# Patient Record
Sex: FEMALE | Race: BLACK OR AFRICAN AMERICAN | NOT HISPANIC OR LATINO | URBAN - METROPOLITAN AREA
[De-identification: names, ages, dates, MRNs, and addresses within clinical notes are randomized per-mention and may not be internally consistent; named-entity substitution may affect disease eponyms.]

---

## 2017-04-14 ENCOUNTER — IMPORTED ENCOUNTER (OUTPATIENT)
Dept: URBAN - METROPOLITAN AREA CLINIC 59 | Facility: CLINIC | Age: 67
End: 2017-04-14

## 2017-04-14 PROBLEM — E11.3293 TYPE II DM W/ MILD NONPROLIFERATIVE DIABETIC RETINOPATHY W/O MACULAR EDEMA OF BILATERAL EYES: Noted: 2017-04-14

## 2017-04-14 PROBLEM — H04.123 TEAR FILM INSUFFICIENCY OF BILATERAL LACRIMAL GLANDS: Noted: 2017-04-14

## 2017-04-14 PROBLEM — H34.8322 TRIBUTARY RETINAL VEIN OCCLUSION OF LT EYE, STABLE: Noted: 2017-04-14

## 2017-04-14 PROCEDURE — 92014 COMPRE OPH EXAM EST PT 1/>: CPT

## 2017-04-14 PROCEDURE — 92134 CPTRZ OPH DX IMG PST SGM RTA: CPT

## 2017-05-12 ENCOUNTER — IMPORTED ENCOUNTER (OUTPATIENT)
Dept: URBAN - METROPOLITAN AREA CLINIC 59 | Facility: CLINIC | Age: 67
End: 2017-05-12

## 2017-05-12 PROBLEM — Z01.00 ENCOUNTER FOR EXAMINATION OF VISION WITHOUT ABNORMAL FINDING: Noted: 2017-05-12

## 2017-05-12 PROBLEM — H52.4 PRESBYOPIA: Noted: 2017-05-12

## 2018-03-12 ENCOUNTER — IMPORTED ENCOUNTER (OUTPATIENT)
Dept: URBAN - METROPOLITAN AREA CLINIC 59 | Facility: CLINIC | Age: 68
End: 2018-03-12

## 2018-03-12 PROBLEM — E11.3293 TYPE II DM W/ MILD NONPROLIFERATIVE DIABETIC RETINOPATHY W/O MACULAR EDEMA OF BILATERAL EYES: Noted: 2018-03-12

## 2018-03-12 PROBLEM — H25.13 BILATERAL NUCLEAR SCLEROSIS CATARACTS: Noted: 2018-03-12

## 2018-03-12 PROBLEM — H34.8322 TRIBUTARY RETINAL VEIN OCCLUSION OF LT EYE, STABLE: Noted: 2018-03-12

## 2018-03-12 PROBLEM — H04.123 TEAR FILM INSUFFICIENCY OF BILATERAL LACRIMAL GLANDS: Noted: 2018-03-12

## 2018-03-12 PROCEDURE — 92014 COMPRE OPH EXAM EST PT 1/>: CPT

## 2018-03-12 PROCEDURE — 92134 CPTRZ OPH DX IMG PST SGM RTA: CPT

## 2018-12-10 ENCOUNTER — IMPORTED ENCOUNTER (OUTPATIENT)
Dept: URBAN - METROPOLITAN AREA CLINIC 59 | Facility: CLINIC | Age: 68
End: 2018-12-10

## 2018-12-10 PROBLEM — E11.3293 TYPE II DM W/ MILD NONPROLIFERATIVE DIABETIC RETINOPATHY W/O MACULAR EDEMA OF BILATERAL EYES: Noted: 2018-12-10

## 2018-12-10 PROBLEM — H34.8322 TRIBUTARY RETINAL VEIN OCCLUSION OF LT EYE, STABLE: Noted: 2018-12-10

## 2018-12-10 PROBLEM — H04.123 TEAR FILM INSUFFICIENCY OF BILATERAL LACRIMAL GLANDS: Noted: 2018-12-10

## 2018-12-10 PROBLEM — H25.13 BILATERAL NUCLEAR SCLEROSIS CATARACTS: Noted: 2018-12-10

## 2018-12-10 PROBLEM — Z79.4 LONG TERM USE OF INSULIN: Noted: 2018-12-10

## 2018-12-10 PROCEDURE — 92014 COMPRE OPH EXAM EST PT 1/>: CPT

## 2018-12-10 PROCEDURE — 92134 CPTRZ OPH DX IMG PST SGM RTA: CPT

## 2019-09-30 ENCOUNTER — IMPORTED ENCOUNTER (OUTPATIENT)
Dept: URBAN - METROPOLITAN AREA CLINIC 59 | Facility: CLINIC | Age: 69
End: 2019-09-30

## 2019-09-30 PROBLEM — H34.8322 TRIBUTARY RETINAL VEIN OCCLUSION OF LT EYE, STABLE: Noted: 2019-09-30

## 2019-09-30 PROBLEM — H04.123 TEAR FILM INSUFFICIENCY OF BILATERAL LACRIMAL GLANDS: Noted: 2019-09-30

## 2019-09-30 PROBLEM — Z79.4 LONG TERM USE OF INSULIN: Noted: 2019-09-30

## 2019-09-30 PROBLEM — H25.13 BILATERAL NUCLEAR SCLEROSIS CATARACTS: Noted: 2019-09-30

## 2019-09-30 PROBLEM — E11.3293 TYPE II DM W/ MILD NONPROLIFERATIVE DIABETIC RETINOPATHY W/O MACULAR EDEMA OF BILATERAL EYES: Noted: 2019-09-30

## 2019-09-30 PROCEDURE — 92134 CPTRZ OPH DX IMG PST SGM RTA: CPT

## 2019-09-30 PROCEDURE — 92014 COMPRE OPH EXAM EST PT 1/>: CPT

## 2020-03-30 ENCOUNTER — APPOINTMENT (RX ONLY)
Dept: URBAN - METROPOLITAN AREA CLINIC 348 | Facility: CLINIC | Age: 70
Setting detail: DERMATOLOGY
End: 2020-03-30

## 2020-03-30 DIAGNOSIS — L50.8 OTHER URTICARIA: ICD-10-CM

## 2020-03-30 PROBLEM — I10 ESSENTIAL (PRIMARY) HYPERTENSION: Status: ACTIVE | Noted: 2020-03-30

## 2020-03-30 PROCEDURE — 99202 OFFICE O/P NEW SF 15 MIN: CPT

## 2020-03-30 PROCEDURE — ? TREATMENT REGIMEN

## 2020-03-30 PROCEDURE — ? COUNSELING

## 2020-03-30 PROCEDURE — ? PRESCRIPTION

## 2020-03-30 RX ORDER — TRIAMCINOLONE ACETONIDE 1 MG/G
CREAM TOPICAL
Qty: 1 | Refills: 1 | Status: ERX | COMMUNITY
Start: 2020-03-30

## 2020-03-30 RX ADMIN — TRIAMCINOLONE ACETONIDE: 1 CREAM TOPICAL at 00:00

## 2020-03-30 ASSESSMENT — LOCATION DETAILED DESCRIPTION DERM
LOCATION DETAILED: LEFT DISTAL POSTERIOR UPPER ARM
LOCATION DETAILED: RIGHT BUTTOCK
LOCATION DETAILED: LEFT PROXIMAL PRETIBIAL REGION
LOCATION DETAILED: LEFT ANTERIOR DISTAL THIGH
LOCATION DETAILED: LEFT BUTTOCK
LOCATION DETAILED: RIGHT LATERAL PROXIMAL PRETIBIAL REGION
LOCATION DETAILED: RIGHT ANTERIOR DISTAL THIGH
LOCATION DETAILED: RIGHT DISTAL POSTERIOR UPPER ARM

## 2020-03-30 ASSESSMENT — LOCATION SIMPLE DESCRIPTION DERM
LOCATION SIMPLE: RIGHT PRETIBIAL REGION
LOCATION SIMPLE: RIGHT BUTTOCK
LOCATION SIMPLE: LEFT PRETIBIAL REGION
LOCATION SIMPLE: LEFT THIGH
LOCATION SIMPLE: LEFT BUTTOCK
LOCATION SIMPLE: LEFT POSTERIOR UPPER ARM
LOCATION SIMPLE: RIGHT POSTERIOR UPPER ARM
LOCATION SIMPLE: RIGHT THIGH

## 2020-03-30 ASSESSMENT — LOCATION ZONE DERM
LOCATION ZONE: TRUNK
LOCATION ZONE: LEG
LOCATION ZONE: ARM

## 2020-03-30 NOTE — PROCEDURE: TREATMENT REGIMEN
Detail Level: Zone
Initiate Treatment: Triamcinolone - apply twice daily to affected areas on the body x 2-4 weeks and then as needed for flares \\nHydroxyzine hcl 25mg - take one tab at night by mouth as needed

## 2020-06-29 ENCOUNTER — IMPORTED ENCOUNTER (OUTPATIENT)
Dept: URBAN - METROPOLITAN AREA CLINIC 59 | Facility: CLINIC | Age: 70
End: 2020-06-29

## 2020-06-29 PROBLEM — Z79.4 LONG TERM USE OF INSULIN: Noted: 2020-06-29

## 2020-06-29 PROBLEM — H40.013 OPEN ANGLE WITH BORDERLINE FINDINGS, LOW RISK, BILATERAL: Noted: 2020-06-29

## 2020-06-29 PROBLEM — H34.8322 TRIBUTARY RETINAL VEIN OCCLUSION OF LT EYE, STABLE: Noted: 2020-06-29

## 2020-06-29 PROBLEM — H43.813 VITREOUS DEGENERATION, BILATERAL: Noted: 2020-06-29

## 2020-06-29 PROBLEM — E11.3293 TYPE II DM W/ MILD NONPROLIFERATIVE DIABETIC RETINOPATHY W/O MACULAR EDEMA OF BILATERAL EYES: Noted: 2020-06-29

## 2020-06-29 PROBLEM — H04.123 TEAR FILM INSUFFICIENCY OF BILATERAL LACRIMAL GLANDS: Noted: 2020-06-29

## 2020-06-29 PROCEDURE — 92014 COMPRE OPH EXAM EST PT 1/>: CPT

## 2020-06-29 PROCEDURE — 92134 CPTRZ OPH DX IMG PST SGM RTA: CPT

## 2021-03-29 ENCOUNTER — IMPORTED ENCOUNTER (OUTPATIENT)
Dept: URBAN - METROPOLITAN AREA CLINIC 59 | Facility: CLINIC | Age: 71
End: 2021-03-29

## 2021-03-29 PROBLEM — H40.013 OPEN ANGLE WITH BORDERLINE FINDINGS, LOW RISK, BILATERAL: Noted: 2021-03-29

## 2021-03-29 PROBLEM — E11.9 TYPE II DM W/O COMPLICATIONS: Noted: 2021-03-29

## 2021-03-29 PROBLEM — H43.813 VITREOUS DEGENERATION, BILATERAL: Noted: 2021-03-29

## 2021-03-29 PROBLEM — H25.813 COMBINED FORMS OF AGE-RELATED CATARACT, BILATERAL: Noted: 2021-03-29

## 2021-03-29 PROCEDURE — 92014 COMPRE OPH EXAM EST PT 1/>: CPT

## 2021-03-29 PROCEDURE — 92134 CPTRZ OPH DX IMG PST SGM RTA: CPT

## 2021-11-08 ENCOUNTER — RX ONLY (OUTPATIENT)
Age: 71
Setting detail: RX ONLY
End: 2021-11-08

## 2021-11-08 RX ORDER — TRIAMCINOLONE ACETONIDE 1 MG/G
CREAM TOPICAL
Qty: 454 | Refills: 0 | Status: ERX | COMMUNITY
Start: 2021-11-08

## 2022-01-03 ENCOUNTER — IMPORTED ENCOUNTER (OUTPATIENT)
Dept: URBAN - METROPOLITAN AREA CLINIC 59 | Facility: CLINIC | Age: 72
End: 2022-01-03

## 2022-01-03 PROBLEM — H40.013 OPEN ANGLE WITH BORDERLINE GLAUCOMA FINDINGS, LOW RISK: Noted: 2022-01-03

## 2022-01-03 PROBLEM — H25.813 COMBINED SENILE CATARACT: Noted: 2022-01-03

## 2022-01-03 PROBLEM — H04.123 DES: Noted: 2022-01-03

## 2022-01-03 PROBLEM — H04.123 TEAR FILM INSUFFICIENCY OF BILATERAL LACRIMAL GLANDS: Noted: 2022-01-03

## 2022-01-03 PROBLEM — E11.9 TYPE II DM W/O COMPLICATIONS: Noted: 2022-01-03

## 2022-01-03 PROBLEM — H40.013 OPEN ANGLE WITH BORDERLINE FINDINGS, LOW RISK, BILATERAL: Noted: 2022-01-03

## 2022-01-03 PROBLEM — H25.813 COMBINED FORMS OF AGE-RELATED CATARACT, BILATERAL: Noted: 2022-01-03

## 2022-01-03 PROCEDURE — 92133 CPTRZD OPH DX IMG PST SGM ON: CPT

## 2022-01-03 PROCEDURE — 92014 COMPRE OPH EXAM EST PT 1/>: CPT

## 2023-01-04 ENCOUNTER — ESTABLISHED COMPREHENSIVE EXAM (OUTPATIENT)
Dept: URBAN - METROPOLITAN AREA CLINIC 22 | Facility: CLINIC | Age: 73
End: 2023-01-04

## 2023-01-04 DIAGNOSIS — H11.31: ICD-10-CM

## 2023-01-04 DIAGNOSIS — H04.123: ICD-10-CM

## 2023-01-04 DIAGNOSIS — H40.013: ICD-10-CM

## 2023-01-04 DIAGNOSIS — Z79.4: ICD-10-CM

## 2023-01-04 DIAGNOSIS — E11.3293: ICD-10-CM

## 2023-01-04 DIAGNOSIS — H25.813: ICD-10-CM

## 2023-01-04 PROCEDURE — 92133 CPTRZD OPH DX IMG PST SGM ON: CPT

## 2023-01-04 PROCEDURE — 92014 COMPRE OPH EXAM EST PT 1/>: CPT

## 2023-01-04 PROCEDURE — 92015 DETERMINE REFRACTIVE STATE: CPT

## 2023-01-04 ASSESSMENT — VISUAL ACUITY
OD_PH: 20/25+1
OS_GLARE: 20/25
OS_CC: 20/25-3
OD_GLARE: 20/40
OD_CC: 20/30-2

## 2023-01-04 ASSESSMENT — TONOMETRY
OD_IOP_MMHG: 15
OS_IOP_MMHG: 13

## 2023-07-17 ENCOUNTER — ESTABLISHED COMPREHENSIVE EXAM (OUTPATIENT)
Dept: URBAN - METROPOLITAN AREA CLINIC 22 | Facility: CLINIC | Age: 73
End: 2023-07-17

## 2023-07-17 DIAGNOSIS — E11.3293: ICD-10-CM

## 2023-07-17 DIAGNOSIS — Z79.4: ICD-10-CM

## 2023-07-17 DIAGNOSIS — H04.123: ICD-10-CM

## 2023-07-17 DIAGNOSIS — H40.013: ICD-10-CM

## 2023-07-17 DIAGNOSIS — H25.813: ICD-10-CM

## 2023-07-17 PROCEDURE — 92134 CPTRZ OPH DX IMG PST SGM RTA: CPT

## 2023-07-17 PROCEDURE — 92014 COMPRE OPH EXAM EST PT 1/>: CPT

## 2023-07-17 ASSESSMENT — TONOMETRY
OS_IOP_MMHG: 12
OD_IOP_MMHG: 12

## 2023-07-17 ASSESSMENT — VISUAL ACUITY
OS_CC: 20/20
OD_PH: 20/25
OD_CC: 20/30

## 2023-10-21 ASSESSMENT — VISUAL ACUITY
OD_CC: 20/30-1
OD_CC: 20/25-2
OS_CC: 20/30-2
OS_CC: 20/30
OD_CC: 20/40-1
OS_CC: 20/25-2
OD_CC: 20/30+2
OS_CC: 20/30+2
OD_CC: 20/25+1
OS_CC: 20/30+2
OD_CC: 20/25
OS_CC: 20/20-2
OS_CC: 20/30+2
OS_CC: 20/30-2
OU_CC: 20/20
OD_CC: 20/25-1
OD_CC: 20/20-1

## 2023-10-21 ASSESSMENT — TONOMETRY
OS_IOP_MMHG: 14
OD_IOP_MMHG: 12
OS_IOP_MMHG: 15
OD_IOP_MMHG: 15
OS_IOP_MMHG: 15
OD_IOP_MMHG: 13
OD_IOP_MMHG: 15
OS_IOP_MMHG: 14
OS_IOP_MMHG: 12
OD_IOP_MMHG: 12
OS_IOP_MMHG: 13
OS_IOP_MMHG: 16
OD_IOP_MMHG: 15
OD_IOP_MMHG: 15

## 2023-10-21 ASSESSMENT — PACHYMETRY
OS_CT_UM: C:  AVG: 551.000 FINAL: 551.000; P:
OD_CT_UM: C:  AVG: 551.000 FINAL: 551.000; P:

## 2024-01-17 ENCOUNTER — ESTABLISHED COMPREHENSIVE EXAM (OUTPATIENT)
Dept: URBAN - METROPOLITAN AREA CLINIC 22 | Facility: CLINIC | Age: 74
End: 2024-01-17

## 2024-01-17 DIAGNOSIS — E11.3293: ICD-10-CM

## 2024-01-17 DIAGNOSIS — H25.813: ICD-10-CM

## 2024-01-17 DIAGNOSIS — Z79.4: ICD-10-CM

## 2024-01-17 DIAGNOSIS — H40.013: ICD-10-CM

## 2024-01-17 PROCEDURE — 92014 COMPRE OPH EXAM EST PT 1/>: CPT

## 2024-01-17 PROCEDURE — 92134 CPTRZ OPH DX IMG PST SGM RTA: CPT

## 2024-01-17 ASSESSMENT — TONOMETRY
OS_IOP_MMHG: 16
OD_IOP_MMHG: 18

## 2024-01-17 ASSESSMENT — VISUAL ACUITY
OS_CC: 20/20-2
OD_CC: 20/20-1

## 2024-05-17 ENCOUNTER — EMERGENCY VISIT (OUTPATIENT)
Dept: URBAN - METROPOLITAN AREA CLINIC 59 | Facility: CLINIC | Age: 74
End: 2024-05-17

## 2024-05-17 DIAGNOSIS — H25.813: ICD-10-CM

## 2024-05-17 DIAGNOSIS — H04.123: ICD-10-CM

## 2024-05-17 DIAGNOSIS — H11.33: ICD-10-CM

## 2024-05-17 PROCEDURE — 92012 INTRM OPH EXAM EST PATIENT: CPT

## 2024-05-17 ASSESSMENT — VISUAL ACUITY
OD_CC: 20/25-1
OS_CC: 20/20-2

## 2024-05-17 ASSESSMENT — TONOMETRY
OS_IOP_MMHG: 15
OD_IOP_MMHG: 15

## 2024-07-22 ENCOUNTER — ESTABLISHED COMPREHENSIVE EXAM (OUTPATIENT)
Dept: URBAN - METROPOLITAN AREA CLINIC 22 | Facility: CLINIC | Age: 74
End: 2024-07-22

## 2024-07-22 DIAGNOSIS — Z79.4: ICD-10-CM

## 2024-07-22 DIAGNOSIS — E11.3293: ICD-10-CM

## 2024-07-22 DIAGNOSIS — H25.813: ICD-10-CM

## 2024-07-22 DIAGNOSIS — H04.123: ICD-10-CM

## 2024-07-22 PROCEDURE — 92134 CPTRZ OPH DX IMG PST SGM RTA: CPT

## 2024-07-22 PROCEDURE — 92014 COMPRE OPH EXAM EST PT 1/>: CPT

## 2024-07-22 ASSESSMENT — VISUAL ACUITY
OS_CC: 20/20-1
OD_CC: 20/20

## 2024-07-22 ASSESSMENT — TONOMETRY
OS_IOP_MMHG: 16
OD_IOP_MMHG: 18

## 2025-01-15 ENCOUNTER — ESTABLISHED COMPREHENSIVE EXAM (OUTPATIENT)
Dept: URBAN - METROPOLITAN AREA CLINIC 22 | Facility: CLINIC | Age: 75
End: 2025-01-15

## 2025-01-15 DIAGNOSIS — H04.123: ICD-10-CM

## 2025-01-15 DIAGNOSIS — H25.813: ICD-10-CM

## 2025-01-15 DIAGNOSIS — Z79.4: ICD-10-CM

## 2025-01-15 DIAGNOSIS — E11.3293: ICD-10-CM

## 2025-01-15 PROCEDURE — 92014 COMPRE OPH EXAM EST PT 1/>: CPT

## 2025-01-15 PROCEDURE — 92134 CPTRZ OPH DX IMG PST SGM RTA: CPT

## 2025-01-15 ASSESSMENT — TONOMETRY
OD_IOP_MMHG: 14
OS_IOP_MMHG: 14

## 2025-01-15 ASSESSMENT — VISUAL ACUITY
OD_CC: 20/20-2
OS_CC: 20/20-1